# Patient Record
Sex: MALE | URBAN - METROPOLITAN AREA
[De-identification: names, ages, dates, MRNs, and addresses within clinical notes are randomized per-mention and may not be internally consistent; named-entity substitution may affect disease eponyms.]

---

## 2017-08-11 ENCOUNTER — NURSE TRIAGE (OUTPATIENT)
Dept: NURSING | Facility: CLINIC | Age: 27
End: 2017-08-11

## 2017-08-12 NOTE — TELEPHONE ENCOUNTER
Marcus was seen in an [facility] for a boil in the Axilla , and was placed on an antibiotic. He went back today , they did not wan to drain the wound  Due to its location,. The results of culture are not back yet, and  Patient reports increased size to the lump, aprox 2 inches across, and dark red, it is draining pus and blood, and soaking the gauze used. He does not feel feverish, but reports increased pain level, and size to red area. He is advised to seek care in emergency room  for possible drainage .